# Patient Record
Sex: MALE | Race: OTHER | NOT HISPANIC OR LATINO | Employment: OTHER | URBAN - METROPOLITAN AREA
[De-identification: names, ages, dates, MRNs, and addresses within clinical notes are randomized per-mention and may not be internally consistent; named-entity substitution may affect disease eponyms.]

---

## 2021-12-23 ENCOUNTER — HOSPITAL ENCOUNTER (EMERGENCY)
Facility: HOSPITAL | Age: 71
Discharge: HOME | End: 2021-12-23
Attending: EMERGENCY MEDICINE
Payer: MEDICARE

## 2021-12-23 VITALS
OXYGEN SATURATION: 97 % | TEMPERATURE: 98 F | HEART RATE: 65 BPM | WEIGHT: 220 LBS | SYSTOLIC BLOOD PRESSURE: 145 MMHG | BODY MASS INDEX: 33.34 KG/M2 | RESPIRATION RATE: 18 BRPM | DIASTOLIC BLOOD PRESSURE: 63 MMHG | HEIGHT: 68 IN

## 2021-12-23 DIAGNOSIS — M25.431 SWELLING OF RIGHT WRIST: Primary | ICD-10-CM

## 2021-12-23 DIAGNOSIS — M67.431 GANGLION CYST OF WRIST, RIGHT: ICD-10-CM

## 2021-12-23 PROCEDURE — 25000003 PHARM REV CODE 250: Mod: ER | Performed by: NURSE PRACTITIONER

## 2021-12-23 PROCEDURE — 99283 EMERGENCY DEPT VISIT LOW MDM: CPT | Mod: 25,ER

## 2021-12-23 RX ORDER — LOSARTAN POTASSIUM 50 MG/1
50 TABLET ORAL DAILY
COMMUNITY

## 2021-12-23 RX ORDER — ALLOPURINOL 300 MG/1
300 TABLET ORAL DAILY
COMMUNITY

## 2021-12-23 RX ORDER — METFORMIN HYDROCHLORIDE 850 MG/1
850 TABLET ORAL
COMMUNITY

## 2021-12-23 RX ORDER — ACETAMINOPHEN 325 MG/1
650 TABLET ORAL
Status: COMPLETED | OUTPATIENT
Start: 2021-12-23 | End: 2021-12-23

## 2021-12-23 RX ADMIN — ACETAMINOPHEN 650 MG: 325 TABLET ORAL at 12:12

## 2021-12-23 NOTE — DISCHARGE INSTRUCTIONS
Follow-up with orthopedic surgery regarding your wrist.  Return to the emergency department for any new or worsening symptoms.

## 2021-12-23 NOTE — ED PROVIDER NOTES
"Encounter Date: 12/23/2021       History     Chief Complaint   Patient presents with    Wrist Pain     Reports "cyst" to right wrist that is painful x 2 days. Skin intact. No redness. No warmth     CC: Wrist swelling    HPI:  This is a 71-year-old male with history of diabetes and gout presenting to the ED with possible "cyst" to the right wrist that patient noticed 2 days ago.  Reports it is painful.  Denies any known injury or trauma.  He is right handed.    The history is provided by the patient. No  was used.     Review of patient's allergies indicates:  No Known Allergies  Past Medical History:   Diagnosis Date    Diabetes mellitus     Gout     Hypertension      History reviewed. No pertinent surgical history.  History reviewed. No pertinent family history.  Social History     Tobacco Use    Smoking status: Never Smoker    Smokeless tobacco: Never Used   Substance Use Topics    Alcohol use: Never    Drug use: Never     Review of Systems   Constitutional: Negative for chills and fever.   HENT: Negative for sore throat.    Respiratory: Negative for shortness of breath.    Cardiovascular: Negative for chest pain.   Gastrointestinal: Negative for nausea.   Genitourinary: Negative for dysuria.   Musculoskeletal: Positive for joint swelling. Negative for back pain.   Skin: Negative for rash.   Neurological: Negative for weakness.   Hematological: Does not bruise/bleed easily.       Physical Exam     Initial Vitals   BP Pulse Resp Temp SpO2   12/23/21 1139 12/23/21 1139 12/23/21 1139 12/23/21 1317 12/23/21 1139   (!) 145/63 65 18 98.1 °F (36.7 °C) 97 %      MAP       --                Physical Exam    Vitals reviewed.  Constitutional: He appears well-developed and well-nourished. He is not diaphoretic.  Non-toxic appearance. He does not have a sickly appearance. He does not appear ill. No distress.   HENT:   Head: Normocephalic and atraumatic.   Right Ear: External ear normal.   Left Ear: " External ear normal.   Neck:   Normal range of motion.  Cardiovascular: Intact distal pulses.   Pulmonary/Chest: No respiratory distress.   Musculoskeletal:         General: Normal range of motion.      Right wrist: Swelling present.      Cervical back: Normal range of motion.      Comments: Tender round nodule to dorsal wrist. No overlying erythema or warmth.  Full range of motion.  Neurovascular intact.     Neurological: He is alert and oriented to person, place, and time. GCS eye subscore is 4. GCS verbal subscore is 5. GCS motor subscore is 6.   Skin: Skin is warm, dry and intact. No rash noted. No erythema.   Psychiatric: He has a normal mood and affect. Thought content normal.         ED Course   Procedures  Labs Reviewed - No data to display       Imaging Results          X-Ray Wrist Complete Right (Final result)  Result time 12/23/21 12:59:54    Final result by Ronald Herndon MD (12/23/21 12:59:54)                 Impression:      1. No acute displaced fracture or dislocation of the wrist.      Electronically signed by: Ronald Herndon MD  Date:    12/23/2021  Time:    12:59             Narrative:    EXAMINATION:  XR WRIST COMPLETE 3 VIEWS RIGHT    CLINICAL HISTORY:  Effusion, right wrist    TECHNIQUE:  PA, lateral, and oblique views of the right wrist were performed.    COMPARISON:  None    FINDINGS:  Three views right wrist.    No acute displaced fracture or dislocation of the wrist.  No radiopaque foreign body.  No significant edema.                                 Medications   acetaminophen tablet 650 mg (650 mg Oral Given 12/23/21 1239)     Medical Decision Making:   Differential Diagnosis:   Ganglion cyst, osteoarthritis, fracture, sprain, dislocation, gout, others  ED Management:  71-year-old male presenting to the ED with painful wrist mass.  Denies injury or trauma.  Suspect ganglion cyst.  No signs of infection.  No neurovascular compromise.  X-ray negative for fracture dislocation.  Will  place patient in Ace wrap.  Ortho follow-up.                      Clinical Impression:   Final diagnoses:  [M25.431] Swelling of right wrist (Primary)  [M67.431] Ganglion cyst of wrist, right          ED Disposition Condition    Discharge Stable        ED Prescriptions     None        Follow-up Information     Follow up With Specialties Details Why Contact Info    Mayra Hawkins III, MD Orthopedic Surgery Schedule an appointment as soon as possible for a visit  For follow-up [address]  [facility] [address]  [phone]      [facility] ED Emergency Medicine Go to  If symptoms worsen [address] [facility] [address] [phone]           Lydia Vieira NP  12/23/21 5614